# Patient Record
Sex: FEMALE | NOT HISPANIC OR LATINO | Employment: UNEMPLOYED | URBAN - METROPOLITAN AREA
[De-identification: names, ages, dates, MRNs, and addresses within clinical notes are randomized per-mention and may not be internally consistent; named-entity substitution may affect disease eponyms.]

---

## 2020-12-31 ENCOUNTER — TELEPHONE (OUTPATIENT)
Dept: OBSTETRICS AND GYNECOLOGY | Facility: CLINIC | Age: 20
End: 2020-12-31

## 2020-12-31 NOTE — TELEPHONE ENCOUNTER
----- Message from Ghada Ramirez sent at 12/31/2020  1:37 PM CST -----  Regarding: Returning call  Contact: Kiki/lashawn  Type:  Patient Returning Call    Who Called:Kiki/mom   Who Left Message for Patient: unk  Does the patient know what this is regarding?:appt access   Would the patient rather a call back or a response via [facility]? Call back   Best Call Back Number: [phone]  Additional Information:

## 2021-01-15 ENCOUNTER — HISTORICAL (OUTPATIENT)
Dept: ADMINISTRATIVE | Facility: HOSPITAL | Age: 21
End: 2021-01-15

## 2021-01-18 LAB — FINAL CULTURE: NORMAL

## 2021-03-10 PROBLEM — N93.9 VAGINAL BLEEDING: Status: ACTIVE | Noted: 2021-03-10

## 2021-03-10 PROBLEM — N89.8 VAGINAL DISCHARGE: Status: ACTIVE | Noted: 2021-03-10

## 2021-03-10 PROBLEM — R10.9 ABDOMINAL PAIN: Status: ACTIVE | Noted: 2021-03-10

## 2021-03-11 PROBLEM — Z3A.17 17 WEEKS GESTATION OF PREGNANCY: Status: ACTIVE | Noted: 2021-03-11

## 2022-02-21 PROBLEM — Z3A.17 17 WEEKS GESTATION OF PREGNANCY: Status: RESOLVED | Noted: 2021-03-11 | Resolved: 2022-02-21

## 2023-12-04 ENCOUNTER — TELEPHONE (OUTPATIENT)
Dept: OBSTETRICS AND GYNECOLOGY | Facility: CLINIC | Age: 23
End: 2023-12-04
Payer: MEDICAID

## 2023-12-05 ENCOUNTER — TELEPHONE (OUTPATIENT)
Dept: OBSTETRICS AND GYNECOLOGY | Facility: CLINIC | Age: 23
End: 2023-12-05
Payer: MEDICAID

## 2023-12-14 ENCOUNTER — ROUTINE PRENATAL (OUTPATIENT)
Dept: OBSTETRICS AND GYNECOLOGY | Facility: CLINIC | Age: 23
End: 2023-12-14
Payer: MEDICAID

## 2023-12-14 VITALS — HEART RATE: 98 BPM | SYSTOLIC BLOOD PRESSURE: 121 MMHG | WEIGHT: 158 LBS | DIASTOLIC BLOOD PRESSURE: 70 MMHG

## 2023-12-14 DIAGNOSIS — J45.909 ASTHMA, UNSPECIFIED ASTHMA SEVERITY, UNSPECIFIED WHETHER COMPLICATED, UNSPECIFIED WHETHER PERSISTENT: ICD-10-CM

## 2023-12-14 DIAGNOSIS — Z34.02 ENCOUNTER FOR SUPERVISION OF NORMAL FIRST PREGNANCY IN SECOND TRIMESTER: Primary | ICD-10-CM

## 2023-12-14 DIAGNOSIS — F17.200 SMOKER: ICD-10-CM

## 2023-12-14 PROCEDURE — 99203 PR OFFICE/OUTPT VISIT, NEW, LEVL III, 30-44 MIN: ICD-10-PCS | Mod: TH,S$GLB,, | Performed by: OBSTETRICS & GYNECOLOGY

## 2023-12-14 PROCEDURE — 99203 OFFICE O/P NEW LOW 30 MIN: CPT | Mod: TH,S$GLB,, | Performed by: OBSTETRICS & GYNECOLOGY

## 2023-12-14 RX ORDER — BUPROPION HYDROCHLORIDE 150 MG/1
150 TABLET ORAL EVERY MORNING
Qty: 30 TABLET | Refills: 2 | Status: SHIPPED | OUTPATIENT
Start: 2023-12-14 | End: 2024-12-13

## 2023-12-14 NOTE — PROGRESS NOTES
Subjective:       Patient ID: Pau De Los Santos is a 23 y.o.  at 20w4d     Chief Complaint:  Initial Prenatal Visit      History of Present Illness  No complaints. Reports normal sx. Labs and history reviewed with pt.     OB transfer- asthma smoker.  Prev  no complications        Review of Systems  Denies n/v, f/c, dysuria, contractions,   VD, VB, round ligament pain, headaches, preE ROS       Objective:     Vitals:    23 1313   BP: 121/70   Pulse: 98     Wt Readings from Last 3 Encounters:   23 71.7 kg (158 lb)       nad  NCAT  pupils normal size  Skin nml no rashes or lesions  No resp distress, resp even and unlabored  Gravid nt, no rebound no guarding  No cyanosis or clubbing, edema appropriate for pregn    FHT: 150's    Assessment:        1. Encounter for supervision of normal first pregnancy in second trimester    2. Smoker    3. Asthma, unspecified asthma severity, unspecified whether complicated, unspecified whether persistent                Plan:     Encouraged PNV  Pain, fever, bleeding precautions   RTC 3 weeks      Welbutrin  Smoking cessation

## 2023-12-19 DIAGNOSIS — Z34.02 ENCOUNTER FOR SUPERVISION OF NORMAL FIRST PREGNANCY IN SECOND TRIMESTER: Primary | ICD-10-CM

## 2023-12-20 ENCOUNTER — PROCEDURE VISIT (OUTPATIENT)
Dept: OBSTETRICS AND GYNECOLOGY | Facility: CLINIC | Age: 23
End: 2023-12-20
Payer: MEDICAID

## 2023-12-20 DIAGNOSIS — Z34.02 ENCOUNTER FOR SUPERVISION OF NORMAL FIRST PREGNANCY IN SECOND TRIMESTER: ICD-10-CM

## 2023-12-20 PROCEDURE — 76805 OB US >/= 14 WKS SNGL FETUS: CPT | Mod: S$GLB,,, | Performed by: OBSTETRICS & GYNECOLOGY

## 2023-12-20 PROCEDURE — 76805 US OB/GYN PROCEDURE (VIEWPOINT): ICD-10-PCS | Mod: S$GLB,,, | Performed by: OBSTETRICS & GYNECOLOGY

## 2024-01-22 DIAGNOSIS — Z34.82 ENCOUNTER FOR SUPERVISION OF NORMAL PREGNANCY IN MULTIGRAVIDA IN SECOND TRIMESTER: Primary | ICD-10-CM

## 2024-01-26 ENCOUNTER — ROUTINE PRENATAL (OUTPATIENT)
Dept: OBSTETRICS AND GYNECOLOGY | Facility: CLINIC | Age: 24
End: 2024-01-26
Payer: MEDICAID

## 2024-01-26 VITALS — HEART RATE: 89 BPM | SYSTOLIC BLOOD PRESSURE: 117 MMHG | WEIGHT: 168 LBS | DIASTOLIC BLOOD PRESSURE: 74 MMHG

## 2024-01-26 DIAGNOSIS — J45.909 ASTHMA, UNSPECIFIED ASTHMA SEVERITY, UNSPECIFIED WHETHER COMPLICATED, UNSPECIFIED WHETHER PERSISTENT: ICD-10-CM

## 2024-01-26 DIAGNOSIS — Z34.83 ENCOUNTER FOR SUPERVISION OF NORMAL PREGNANCY IN MULTIGRAVIDA IN THIRD TRIMESTER: Primary | ICD-10-CM

## 2024-01-26 DIAGNOSIS — F17.200 SMOKER: ICD-10-CM

## 2024-01-26 PROCEDURE — 99213 OFFICE O/P EST LOW 20 MIN: CPT | Mod: TH,S$GLB,, | Performed by: OBSTETRICS & GYNECOLOGY

## 2024-01-26 NOTE — PROGRESS NOTES
Subjective:       Patient ID: Pau De Los Santos is a 23 y.o.  at 26w5d     Chief Complaint:  Routine Prenatal Visit      History of Present Illness  No complaints. Reports normal sx. Labs and history reviewed with pt.         Review of Systems  Denies n/v, f/c, dysuria, contractions,   VD, VB, round ligament pain, headaches, preE ROS       Objective:     Vitals:    24 1006   BP: 117/74   Pulse: 89     Wt Readings from Last 3 Encounters:   24 76.2 kg (168 lb)   24 73.9 kg (163 lb)   23 71.7 kg (158 lb)       nad  NCAT  pupils normal size  Skin nml no rashes or lesions  No resp distress, resp even and unlabored  Gravid nt, no rebound no guarding  No cyanosis or clubbing, edema appropriate for pregn    FHT: 150's      Assessment:        1. Encounter for supervision of normal pregnancy in multigravida in third trimester    2. Asthma, unspecified asthma severity, unspecified whether complicated, unspecified whether persistent    3. Smoker                Plan:   OS failed- doing 3 hr gtt  Encouraged PNV  Pain, fever, bleeding precautions   RTC 3weeks

## 2024-02-05 LAB
GLUCOSE, 1 HOUR: 100 MG/DL (ref 70–99)
GLUCOSE, 2 HOUR: 97 MG/DL (ref 54–179)
GLUCOSE, 3 HOUR: 82 MG/DL (ref 54–139)
GLUCOSE, FASTING: 82 MG/DL (ref 74–109)

## 2024-02-16 ENCOUNTER — ROUTINE PRENATAL (OUTPATIENT)
Dept: OBSTETRICS AND GYNECOLOGY | Facility: CLINIC | Age: 24
End: 2024-02-16
Payer: MEDICAID

## 2024-02-16 VITALS — SYSTOLIC BLOOD PRESSURE: 135 MMHG | DIASTOLIC BLOOD PRESSURE: 73 MMHG | HEART RATE: 96 BPM | WEIGHT: 173 LBS

## 2024-02-16 DIAGNOSIS — Z34.83 ENCOUNTER FOR SUPERVISION OF NORMAL PREGNANCY IN MULTIGRAVIDA IN THIRD TRIMESTER: Primary | ICD-10-CM

## 2024-02-16 LAB
ABS NRBC COUNT: 0 X 10 3/UL (ref 0–0.01)
ABSOLUTE BASOPHIL: 0.04 X 10 3/UL (ref 0–0.22)
ABSOLUTE EOSINOPHIL: 0.24 X 10 3/UL (ref 0.04–0.54)
ABSOLUTE IMMATURE GRAN: 0.04 X 10 3/UL (ref 0–0.04)
ABSOLUTE LYMPHOCYTE: 1.78 X 10 3/UL (ref 0.86–4.75)
ABSOLUTE MONOCYTE: 0.75 X 10 3/UL (ref 0.22–1.08)
BASOPHILS NFR BLD: 0.4 % (ref 0.2–1.2)
EOSINOPHIL NFR BLD: 2.3 % (ref 0.7–7)
HCT VFR BLD AUTO: 30.3 % (ref 37–47)
HGB BLD-MCNC: 10 G/DL (ref 12–16)
IMMATURE GRANULOCYTES: 0.4 % (ref 0–0.5)
LYMPHOCYTES NFR BLD: 17.1 % (ref 19.3–53.1)
MCH RBC QN AUTO: 30.7 PG (ref 27–32)
MCHC RBC AUTO-ENTMCNC: 33 G/DL (ref 32–36)
MCV RBC AUTO: 92.9 FL (ref 82–100)
MONOCYTES NFR BLD: 7.2 % (ref 4.7–12.5)
NEUTROPHILS # BLD AUTO: 7.53 X 10 3/UL (ref 2.15–7.56)
NEUTROPHILS NFR BLD: 72.6 % (ref 34–71.1)
NUCLEATED RED BLOOD CELLS: 0 /100 WBC (ref 0–0.2)
PLATELET # BLD AUTO: 195 X 10 3/UL (ref 135–400)
RBC # BLD AUTO: 3.26 X 10 6/UL (ref 4.2–5.4)
RDW-SD: 44.8 FL (ref 37–54)
WBC # BLD: 10.38 X 10 3/UL (ref 4.3–10.8)

## 2024-02-16 PROCEDURE — 99213 OFFICE O/P EST LOW 20 MIN: CPT | Mod: 25,TH,S$GLB, | Performed by: OBSTETRICS & GYNECOLOGY

## 2024-02-16 PROCEDURE — 90471 IMMUNIZATION ADMIN: CPT | Mod: S$GLB,,, | Performed by: OBSTETRICS & GYNECOLOGY

## 2024-02-16 PROCEDURE — 90715 TDAP VACCINE 7 YRS/> IM: CPT | Mod: S$GLB,,, | Performed by: OBSTETRICS & GYNECOLOGY

## 2024-02-16 NOTE — PROGRESS NOTES
Subjective:       Patient ID: Pau De Los Santos is a 23 y.o.  at 29w5d     Chief Complaint:  Routine Prenatal Visit      History of Present Illness  No complaints. Reports normal sx. Labs and history reviewed with pt.         Review of Systems  Denies n/v, f/c, dysuria, contractions,   VD, VB, round ligament pain, headaches, preE ROS       Objective:     Vitals:    24 1036   BP: 135/73   Pulse: 96     Wt Readings from Last 3 Encounters:   24 78.5 kg (173 lb)   24 76.2 kg (168 lb)   24 73.9 kg (163 lb)       nad  NCAT  pupils normal size  Skin nml no rashes or lesions  No resp distress, resp even and unlabored  Gravid nt, no rebound no guarding  No cyanosis or clubbing, edema appropriate for pregn    FHT: 150's      Assessment:        1. Encounter for supervision of normal pregnancy in multigravida in third trimester                Plan:      tdap  3rd tri labs   Encouraged PNV  Pain, fever, bleeding precautions   RTC 3 weeks

## 2024-02-19 DIAGNOSIS — O99.013 ANEMIA AFFECTING PREGNANCY IN THIRD TRIMESTER: Primary | ICD-10-CM

## 2024-02-19 RX ORDER — FERROUS SULFATE 325(65) MG
325 TABLET, DELAYED RELEASE (ENTERIC COATED) ORAL 2 TIMES DAILY
Qty: 60 TABLET | Refills: 2 | Status: SHIPPED | OUTPATIENT
Start: 2024-02-19 | End: 2024-02-20 | Stop reason: SDUPTHER

## 2024-02-20 DIAGNOSIS — O99.013 ANEMIA AFFECTING PREGNANCY IN THIRD TRIMESTER: ICD-10-CM

## 2024-02-21 RX ORDER — FERROUS SULFATE 325(65) MG
325 TABLET, DELAYED RELEASE (ENTERIC COATED) ORAL 2 TIMES DAILY
Qty: 60 TABLET | Refills: 2 | Status: SHIPPED | OUTPATIENT
Start: 2024-02-21

## 2024-03-24 ENCOUNTER — PATIENT MESSAGE (OUTPATIENT)
Dept: OTHER | Facility: OTHER | Age: 24
End: 2024-03-24
Payer: MEDICAID